# Patient Record
Sex: MALE | Race: WHITE | Employment: FULL TIME | URBAN - METROPOLITAN AREA
[De-identification: names, ages, dates, MRNs, and addresses within clinical notes are randomized per-mention and may not be internally consistent; named-entity substitution may affect disease eponyms.]

---

## 2020-12-29 ENCOUNTER — IMMUNIZATIONS (OUTPATIENT)
Dept: FAMILY MEDICINE CLINIC | Facility: HOSPITAL | Age: 65
End: 2020-12-29
Payer: MEDICARE

## 2020-12-29 DIAGNOSIS — Z23 ENCOUNTER FOR IMMUNIZATION: ICD-10-CM

## 2020-12-29 PROCEDURE — 91301 SARS-COV-2 / COVID-19 MRNA VACCINE (MODERNA) 100 MCG: CPT

## 2020-12-29 PROCEDURE — 0011A SARS-COV-2 / COVID-19 MRNA VACCINE (MODERNA) 100 MCG: CPT

## 2021-01-26 ENCOUNTER — IMMUNIZATIONS (OUTPATIENT)
Dept: FAMILY MEDICINE CLINIC | Facility: HOSPITAL | Age: 66
End: 2021-01-26

## 2021-01-26 DIAGNOSIS — Z23 ENCOUNTER FOR IMMUNIZATION: Primary | ICD-10-CM

## 2021-01-26 PROCEDURE — 0012A SARS-COV-2 / COVID-19 MRNA VACCINE (MODERNA) 100 MCG: CPT

## 2021-01-26 PROCEDURE — 91301 SARS-COV-2 / COVID-19 MRNA VACCINE (MODERNA) 100 MCG: CPT

## 2021-09-08 ENCOUNTER — APPOINTMENT (OUTPATIENT)
Dept: URGENT CARE | Facility: CLINIC | Age: 66
End: 2021-09-08

## 2021-09-08 DIAGNOSIS — Z02.1 ENCOUNTER FOR PRE-EMPLOYMENT HEALTH SCREENING EXAMINATION: Primary | ICD-10-CM

## 2021-09-08 PROCEDURE — 86765 RUBEOLA ANTIBODY: CPT

## 2021-09-08 PROCEDURE — 86480 TB TEST CELL IMMUN MEASURE: CPT

## 2021-09-08 PROCEDURE — 86735 MUMPS ANTIBODY: CPT

## 2021-09-08 PROCEDURE — 86762 RUBELLA ANTIBODY: CPT

## 2021-09-08 PROCEDURE — 86787 VARICELLA-ZOSTER ANTIBODY: CPT

## 2021-09-09 LAB
MEV IGG SER QL: NORMAL
MUV IGG SER QL: NORMAL
RUBV IGG SERPL IA-ACNC: >175 IU/ML
VZV IGG SER IA-ACNC: NORMAL

## 2021-09-13 LAB
GAMMA INTERFERON BACKGROUND BLD IA-ACNC: 0.02 IU/ML
M TB IFN-G BLD-IMP: NEGATIVE
M TB IFN-G CD4+ BCKGRND COR BLD-ACNC: 0 IU/ML
M TB IFN-G CD4+ BCKGRND COR BLD-ACNC: 0 IU/ML
MITOGEN IGNF BCKGRD COR BLD-ACNC: >10 IU/ML

## 2025-01-07 NOTE — PROGRESS NOTES
Name: Regis Che      : 1955      MRN: [id number]  Encounter Provider: David Orozco MD  Encounter Date: 2025   Encounter department: [facility]  :  Assessment & Plan  Benign prostatic hyperplasia with lower urinary tract symptoms, symptom details unspecified  Known bph/bpe for 15 years  On alpha blocker and finasteride  Bothered by decreased semen volumes   Potentially interested in surgical therapy in the future after further workup of elevated PSA    Orders:    POCT urine dip    POCT Measure PVR    Elevated PSA  I discussed with the patient the discovery of the PSA molecule and its original use in determining the return of prostate cancer after definitive therapy.  I described the normal function of the PSA molecule in the reproductive process and also discussed the detection of PSA in the blood.  We discussed the controversial history of PSA screening for prostate cancer in the [place] as well as the risk of over detection and over treatment of prostate cancer by way of PSA screening.  The patient understands that PSA blood testing is an imperfect way to screen for prostate cancer and that elevated PSA levels in the blood may also be caused by infection, inflammation, prostatic trauma or manipulation, urological procedures, or by benign prostatic enlargement.    The role of the digital rectal examination in prostate cancer screening was also discussed and I discussed with him that there is large interobserver variability in the findings of digital rectal examination.    We discussed the continued workup of elevated PSA or abnormal digital rectal examination in the form of the performance of a prostate biopsy.  The preparation for, and steps of, an office-based transrectal ultrasound of prostate biopsy were described to the patient. Benefits of obtaining tissue for pathologic analysis were discussed with him and the risks of prostate biopsy were also  discussed at length.  These risks include but are not limited to infection, bleeding, pain, sepsis with need for admission to hospital, risk of change in sexual function, and risk of diagnosis with prostate cancer.  Alternatives to prostate biopsy in the form of continued PSA and OMAR surveillance were also offered to him.  All of his questions and concerns were answered and addressed with regard to that detailed above.    His prostate is asymmetrical with a left-sided prostate nodule near the base to mid gland measuring 1 cm.  Over the last few years his PSA has gone from 4.2 in 20 19-5.5 in 20 22-6.7 in 2023.  Additionally, given use of a 5 alpha reductase inhibitor when doubled this corrected value is near 14.    Recommend an MRI of the prostate for determination of MRI targeted versus office-based prostate biopsy.    He will return in some weeks for review of his prostate MRI.  Following results of this he will need either a MRI targeted biopsy or an office-based prostate biopsy if no target lesions noted on MRI    Orders:    Basic metabolic panel; Future    MRI prostate multiparametric wo w contrast; Future      Urinalysis  PVR please  Does he have a psa? If not he needs one    History of Present Illness   Regis Che is a 69 y.o. male who presents for elevated psa and bph/LUTS  On dual medical therapy at this time    Referred to me in consultation by Yovany Soto MD, today's note has been sent to the referring doctor.            PVR 45 mL  Urine dip analysis today is negative for leukocytes, nitrites, + for trace blood, protein, ketones, and glucose and is clear yellow      The following portions of the patient's history were reviewed and updated as appropriate: allergies, current medications, past family history, past medical history, past social history, past surgical history and problem list.    AUA SYMPTOM SCORE      Flowsheet Row Most Recent Value   AUA SYMPTOM SCORE    How often have you had a  "sensation of not emptying your bladder completely after you finished urinating? 3 (P)     How often have you had to urinate again less than two hours after you finished urinating? 5 (P)     How often have you found you stopped and started again several times when you urinate? 3 (P)     How often have you found it difficult to postpone urination? 1 (P)     How often have you had a weak urinary stream? 1 (P)     How often have you had to push or strain to begin urination? 0 (P)     How many times did you most typically get up to urinate from the time you went to bed at night until the time you got up in the morning? 4 (P)     Quality of Life: If you were to spend the rest of your life with your urinary condition just the way it is now, how would you feel about that? 3 (P)     AUA SYMPTOM SCORE 17 (P)            Review of Systems   Constitutional: Negative.    HENT: Negative.     Eyes: Negative.    Respiratory: Negative.     Cardiovascular: Negative.    Gastrointestinal: Negative.    Endocrine: Negative.    Genitourinary: Negative.    Musculoskeletal: Negative.    Skin: Negative.    Allergic/Immunologic: Negative.    Neurological: Negative.    Hematological: Negative.    Psychiatric/Behavioral: Negative.            Objective   /84 (BP Location: Left arm, Patient Position: Sitting, Cuff Size: Adult)   Pulse 57   Ht 5' 5\" (1.651 m)   Wt 68.9 kg (151 lb 12.8 oz)   SpO2 99%   BMI 25.26 kg/m²     Physical Exam  Vitals reviewed.   Constitutional:       General: He is not in acute distress.     Appearance: Normal appearance. He is well-developed. He is not ill-appearing, toxic-appearing or diaphoretic.   HENT:      Head: Normocephalic and atraumatic.      Nose: Nose normal.      Mouth/Throat:      Mouth: Mucous membranes are moist.   Eyes:      General: No scleral icterus.        Right eye: No discharge.         Left eye: No discharge.   Neck:      Thyroid: No thyromegaly.      Trachea: No tracheal deviation. " "  Pulmonary:      Effort: Pulmonary effort is normal.   Chest:      Chest wall: No tenderness.   Abdominal:      General: There is no distension.      Palpations: There is no mass.      Tenderness: There is no abdominal tenderness. There is no guarding.      Hernia: No hernia is present.   Genitourinary:     Comments: Enlarged asymmetrical prostate with more prominence of the left Justus prostate and with a nodule at the base to mid gland measuring 1 cm  Musculoskeletal:         General: No deformity or signs of injury. Normal range of motion.      Cervical back: Normal range of motion and neck supple.   Lymphadenopathy:      Cervical: No cervical adenopathy.   Skin:     General: Skin is dry.      Coloration: Skin is not jaundiced or pale.      Findings: No erythema.   Neurological:      Mental Status: He is alert and oriented to person, place, and time.      Cranial Nerves: No cranial nerve deficit.      Motor: No abnormal muscle tone.      Coordination: Coordination normal.   Psychiatric:         Mood and Affect: Mood normal.         Behavior: Behavior normal.         Thought Content: Thought content normal.         Judgment: Judgment normal.          Results  No results found for: \"PSA\"  No results found for: \"GLUCOSE\", \"CALCIUM\", \"NA\", \"K\", \"CO2\", \"CL\", \"BUN\", \"CREATININE\"  No results found for: \"WBC\", \"HGB\", \"HCT\", \"MCV\", \"PLT\"    Office Urine Dip  Recent Results (from the past hour)   POCT urine dip    Collection Time: 01/09/25  8:42 AM   Result Value Ref Range    LEUKOCYTE ESTERASE,UA neg     NITRITE,UA neg     SL AMB POCT UROBILINOGEN 0.2     POCT URINE PROTEIN neg      PH,UA 5.0     BLOOD,UA trace     SPECIFIC GRAVITY,UA 1.015     KETONES,UA neg     BILIRUBIN,UA neg     GLUCOSE, UA neg      COLOR,UA yellow     CLARITY,UA clear    POCT Measure PVR    Collection Time: 01/09/25  8:46 AM   Result Value Ref Range    POST-VOID RESIDUAL VOLUME, ML POC 45 mL   ]      "

## 2025-01-09 ENCOUNTER — OFFICE VISIT (OUTPATIENT)
Dept: UROLOGY | Facility: HOSPITAL | Age: 70
End: 2025-01-09
Payer: MEDICARE

## 2025-01-09 VITALS
BODY MASS INDEX: 25.29 KG/M2 | SYSTOLIC BLOOD PRESSURE: 124 MMHG | HEIGHT: 65 IN | WEIGHT: 151.8 LBS | DIASTOLIC BLOOD PRESSURE: 84 MMHG | HEART RATE: 57 BPM | OXYGEN SATURATION: 99 %

## 2025-01-09 DIAGNOSIS — N40.1 BENIGN PROSTATIC HYPERPLASIA WITH LOWER URINARY TRACT SYMPTOMS, SYMPTOM DETAILS UNSPECIFIED: ICD-10-CM

## 2025-01-09 DIAGNOSIS — R97.20 ELEVATED PSA: Primary | ICD-10-CM

## 2025-01-09 LAB
POST-VOID RESIDUAL VOLUME, ML POC: 45 ML
SL AMB  POCT GLUCOSE, UA: NORMAL
SL AMB LEUKOCYTE ESTERASE,UA: NORMAL
SL AMB POCT BILIRUBIN,UA: NORMAL
SL AMB POCT BLOOD,UA: NORMAL
SL AMB POCT CLARITY,UA: CLEAR
SL AMB POCT COLOR,UA: YELLOW
SL AMB POCT KETONES,UA: NORMAL
SL AMB POCT NITRITE,UA: NORMAL
SL AMB POCT PH,UA: 5
SL AMB POCT SPECIFIC GRAVITY,UA: 1.01
SL AMB POCT URINE PROTEIN: NORMAL
SL AMB POCT UROBILINOGEN: 0.2

## 2025-01-09 PROCEDURE — 99203 OFFICE O/P NEW LOW 30 MIN: CPT | Performed by: UROLOGY

## 2025-01-09 PROCEDURE — 81002 URINALYSIS NONAUTO W/O SCOPE: CPT | Performed by: UROLOGY

## 2025-01-09 PROCEDURE — 51798 US URINE CAPACITY MEASURE: CPT | Performed by: UROLOGY

## 2025-01-09 RX ORDER — FINASTERIDE 5 MG/1
TABLET, FILM COATED ORAL
COMMUNITY

## 2025-01-09 RX ORDER — SIMVASTATIN 20 MG
1 TABLET ORAL DAILY
COMMUNITY
Start: 2024-11-25

## 2025-01-09 RX ORDER — TAMSULOSIN HYDROCHLORIDE 0.4 MG/1
CAPSULE ORAL
COMMUNITY

## 2025-01-09 RX ORDER — ASPIRIN 81 MG/1
TABLET, CHEWABLE ORAL
COMMUNITY

## 2025-01-09 RX ORDER — LISINOPRIL 10 MG/1
TABLET ORAL
COMMUNITY

## 2025-01-09 RX ORDER — CHLORAL HYDRATE 500 MG
CAPSULE ORAL
COMMUNITY

## 2025-01-09 NOTE — ASSESSMENT & PLAN NOTE
Known bph/bpe for 15 years  On alpha blocker and finasteride  Bothered by decreased semen volumes   Potentially interested in surgical therapy in the future after further workup of elevated PSA    Orders:    POCT urine dip    POCT Measure PVR

## 2025-01-09 NOTE — ASSESSMENT & PLAN NOTE
I discussed with the patient the discovery of the PSA molecule and its original use in determining the return of prostate cancer after definitive therapy.  I described the normal function of the PSA molecule in the reproductive process and also discussed the detection of PSA in the blood.  We discussed the controversial history of PSA screening for prostate cancer in the [place] as well as the risk of over detection and over treatment of prostate cancer by way of PSA screening.  The patient understands that PSA blood testing is an imperfect way to screen for prostate cancer and that elevated PSA levels in the blood may also be caused by infection, inflammation, prostatic trauma or manipulation, urological procedures, or by benign prostatic enlargement.    The role of the digital rectal examination in prostate cancer screening was also discussed and I discussed with him that there is large interobserver variability in the findings of digital rectal examination.    We discussed the continued workup of elevated PSA or abnormal digital rectal examination in the form of the performance of a prostate biopsy.  The preparation for, and steps of, an office-based transrectal ultrasound of prostate biopsy were described to the patient. Benefits of obtaining tissue for pathologic analysis were discussed with him and the risks of prostate biopsy were also discussed at length.  These risks include but are not limited to infection, bleeding, pain, sepsis with need for admission to hospital, risk of change in sexual function, and risk of diagnosis with prostate cancer.  Alternatives to prostate biopsy in the form of continued PSA and OMAR surveillance were also offered to him.  All of his questions and concerns were answered and addressed with regard to that detailed above.    His prostate is asymmetrical with a left-sided prostate nodule near the base to mid gland measuring 1 cm.  Over the last few years his PSA has gone from  4.2 in 20 19-5.5 in 20 22-6.7 in 2023.  Additionally, given use of a 5 alpha reductase inhibitor when doubled this corrected value is near 14.    Recommend an MRI of the prostate for determination of MRI targeted versus office-based prostate biopsy.    He will return in some weeks for review of his prostate MRI.  Following results of this he will need either a MRI targeted biopsy or an office-based prostate biopsy if no target lesions noted on MRI    Orders:    Basic metabolic panel; Future    MRI prostate multiparametric wo w contrast; Future

## 2025-02-03 ENCOUNTER — TELEPHONE (OUTPATIENT)
Age: 70
End: 2025-02-03

## 2025-02-03 NOTE — TELEPHONE ENCOUNTER
Patient called in stating the the MRI was only completed by 1.5 instead of 3. The radiologist didn't notice the 3T didn't notice until after the study was completed. He is wondering if that will be ok. Please advise. Patient had the MRI completed at [facility] this morning.

## 2025-02-04 NOTE — TELEPHONE ENCOUNTER
Multiparametric MRIs of the prostate that are being used for possible OR transperineal prostate biopsies cannot be completed at an outside facility.  Whether it was a 1.5 or 3T, since it was completed at [facility] it would not be compatible with our OR machines.      He will likely need to repeat the MRI in network, if his insurance was pushing back to not approve this, we can set up a peer to peer so that it is approved.

## 2025-02-04 NOTE — TELEPHONE ENCOUNTER
Pt sts he spoke with insurance and they will need a prior authorization sent over to approve or deny another MRI.     Pt has MRI scheduled for 2/18/25 at . Please review and advise.    Pt cb- [phone]

## 2025-02-04 NOTE — TELEPHONE ENCOUNTER
Called [facility] and notified him that the Mri at [facility] is not compatible with [facility] he needs to check with Insurance if they will  pay for a 2nd MRI

## 2025-02-10 NOTE — TELEPHONE ENCOUNTER
Patient called in asking for an update on his prior authorization for his MRI. Please contact patient with an update.

## 2025-02-18 ENCOUNTER — HOSPITAL ENCOUNTER (OUTPATIENT)
Facility: MEDICAL CENTER | Age: 70
Discharge: HOME | End: 2025-02-18
Payer: MEDICARE

## 2025-02-18 DIAGNOSIS — R97.20 ELEVATED PSA: ICD-10-CM

## 2025-02-18 PROCEDURE — 72197 MRI PELVIS W/O & W/DYE: CPT

## 2025-02-18 PROCEDURE — 76377 3D RENDER W/INTRP POSTPROCES: CPT

## 2025-02-18 PROCEDURE — A9585 GADOBUTROL INJECTION: HCPCS | Performed by: UROLOGY

## 2025-02-18 RX ORDER — GADOBUTROL 604.72 MG/ML
6 INJECTION INTRAVENOUS
Status: COMPLETED | OUTPATIENT
Start: 2025-02-18 | End: 2025-02-18

## 2025-02-18 RX ADMIN — GADOBUTROL 6 ML: 604.72 INJECTION INTRAVENOUS at 12:40

## 2025-02-19 ENCOUNTER — APPOINTMENT (EMERGENCY)
Dept: CT IMAGING | Facility: HOSPITAL | Age: 70
End: 2025-02-19
Payer: MEDICARE

## 2025-02-19 ENCOUNTER — HOSPITAL ENCOUNTER (EMERGENCY)
Facility: HOSPITAL | Age: 70
Discharge: HOME | End: 2025-02-19
Attending: EMERGENCY MEDICINE | Admitting: EMERGENCY MEDICINE
Payer: MEDICARE

## 2025-02-19 VITALS
RESPIRATION RATE: 18 BRPM | SYSTOLIC BLOOD PRESSURE: 175 MMHG | WEIGHT: 150 LBS | OXYGEN SATURATION: 97 % | TEMPERATURE: 97.9 F | HEIGHT: 65 IN | BODY MASS INDEX: 24.99 KG/M2 | HEART RATE: 48 BPM | DIASTOLIC BLOOD PRESSURE: 80 MMHG

## 2025-02-19 DIAGNOSIS — R59.0 INGUINAL LYMPHADENOPATHY: ICD-10-CM

## 2025-02-19 DIAGNOSIS — R79.0 LOW MAGNESIUM LEVEL: ICD-10-CM

## 2025-02-19 DIAGNOSIS — R10.31 RIGHT GROIN PAIN: Primary | ICD-10-CM

## 2025-02-19 LAB
ANION GAP SERPL CALCULATED.3IONS-SCNC: 8 MMOL/L (ref 4–13)
BACTERIA UR QL AUTO: NORMAL /HPF
BASOPHILS # BLD AUTO: 0.03 THOUSANDS/ΜL (ref 0–0.1)
BASOPHILS NFR BLD AUTO: 0 % (ref 0–1)
BILIRUB UR QL STRIP: NEGATIVE
BUN SERPL-MCNC: 24 MG/DL (ref 5–25)
CALCIUM SERPL-MCNC: 9.3 MG/DL (ref 8.4–10.2)
CHLORIDE SERPL-SCNC: 107 MMOL/L (ref 96–108)
CK SERPL-CCNC: 100 U/L (ref 39–308)
CLARITY UR: CLEAR
CO2 SERPL-SCNC: 24 MMOL/L (ref 21–32)
COLOR UR: YELLOW
CREAT SERPL-MCNC: 1.12 MG/DL (ref 0.6–1.3)
EOSINOPHIL # BLD AUTO: 0.14 THOUSAND/ΜL (ref 0–0.61)
EOSINOPHIL NFR BLD AUTO: 2 % (ref 0–6)
ERYTHROCYTE [DISTWIDTH] IN BLOOD BY AUTOMATED COUNT: 12.8 % (ref 11.6–15.1)
GFR SERPL CREATININE-BSD FRML MDRD: 66 ML/MIN/1.73SQ M
GLUCOSE SERPL-MCNC: 112 MG/DL (ref 65–140)
GLUCOSE UR STRIP-MCNC: NEGATIVE MG/DL
HCT VFR BLD AUTO: 46 % (ref 36.5–49.3)
HGB BLD-MCNC: 15.3 G/DL (ref 12–17)
HGB UR QL STRIP.AUTO: ABNORMAL
IMM GRANULOCYTES # BLD AUTO: 0.05 THOUSAND/UL (ref 0–0.2)
IMM GRANULOCYTES NFR BLD AUTO: 1 % (ref 0–2)
KETONES UR STRIP-MCNC: NEGATIVE MG/DL
LEUKOCYTE ESTERASE UR QL STRIP: NEGATIVE
LYMPHOCYTES # BLD AUTO: 1.04 THOUSANDS/ΜL (ref 0.6–4.47)
LYMPHOCYTES NFR BLD AUTO: 15 % (ref 14–44)
MAGNESIUM SERPL-MCNC: 1.7 MG/DL (ref 1.9–2.7)
MCH RBC QN AUTO: 31 PG (ref 26.8–34.3)
MCHC RBC AUTO-ENTMCNC: 33.3 G/DL (ref 31.4–37.4)
MCV RBC AUTO: 93 FL (ref 82–98)
MONOCYTES # BLD AUTO: 0.66 THOUSAND/ΜL (ref 0.17–1.22)
MONOCYTES NFR BLD AUTO: 9 % (ref 4–12)
NEUTROPHILS # BLD AUTO: 5.23 THOUSANDS/ΜL (ref 1.85–7.62)
NEUTS SEG NFR BLD AUTO: 73 % (ref 43–75)
NITRITE UR QL STRIP: NEGATIVE
NON-SQ EPI CELLS URNS QL MICRO: NORMAL /HPF
NRBC BLD AUTO-RTO: 0 /100 WBCS
PH UR STRIP.AUTO: 6.5 [PH]
PLATELET # BLD AUTO: 171 THOUSANDS/UL (ref 149–390)
PMV BLD AUTO: 10.2 FL (ref 8.9–12.7)
POTASSIUM SERPL-SCNC: 3.9 MMOL/L (ref 3.5–5.3)
PROT UR STRIP-MCNC: ABNORMAL MG/DL
RBC # BLD AUTO: 4.93 MILLION/UL (ref 3.88–5.62)
RBC #/AREA URNS AUTO: NORMAL /HPF
SODIUM SERPL-SCNC: 139 MMOL/L (ref 135–147)
SP GR UR STRIP.AUTO: 1.02 (ref 1–1.03)
UROBILINOGEN UR STRIP-ACNC: <2 MG/DL
WBC # BLD AUTO: 7.15 THOUSAND/UL (ref 4.31–10.16)
WBC #/AREA URNS AUTO: NORMAL /HPF

## 2025-02-19 PROCEDURE — 99284 EMERGENCY DEPT VISIT MOD MDM: CPT

## 2025-02-19 PROCEDURE — 85025 COMPLETE CBC W/AUTO DIFF WBC: CPT | Performed by: EMERGENCY MEDICINE

## 2025-02-19 PROCEDURE — 82550 ASSAY OF CK (CPK): CPT | Performed by: EMERGENCY MEDICINE

## 2025-02-19 PROCEDURE — 96375 TX/PRO/DX INJ NEW DRUG ADDON: CPT

## 2025-02-19 PROCEDURE — 74177 CT ABD & PELVIS W/CONTRAST: CPT

## 2025-02-19 PROCEDURE — 36415 COLL VENOUS BLD VENIPUNCTURE: CPT | Performed by: EMERGENCY MEDICINE

## 2025-02-19 PROCEDURE — 83735 ASSAY OF MAGNESIUM: CPT | Performed by: EMERGENCY MEDICINE

## 2025-02-19 PROCEDURE — 99284 EMERGENCY DEPT VISIT MOD MDM: CPT | Performed by: EMERGENCY MEDICINE

## 2025-02-19 PROCEDURE — 81001 URINALYSIS AUTO W/SCOPE: CPT | Performed by: EMERGENCY MEDICINE

## 2025-02-19 PROCEDURE — 96365 THER/PROPH/DIAG IV INF INIT: CPT

## 2025-02-19 PROCEDURE — 80048 BASIC METABOLIC PNL TOTAL CA: CPT | Performed by: EMERGENCY MEDICINE

## 2025-02-19 RX ORDER — METHOCARBAMOL 500 MG/1
500 TABLET, FILM COATED ORAL 2 TIMES DAILY
Qty: 20 TABLET | Refills: 0 | Status: SHIPPED | OUTPATIENT
Start: 2025-02-19

## 2025-02-19 RX ORDER — KETOROLAC TROMETHAMINE 30 MG/ML
15 INJECTION, SOLUTION INTRAMUSCULAR; INTRAVENOUS ONCE
Status: COMPLETED | OUTPATIENT
Start: 2025-02-19 | End: 2025-02-19

## 2025-02-19 RX ORDER — METHOCARBAMOL 500 MG/1
500 TABLET, FILM COATED ORAL ONCE
Status: COMPLETED | OUTPATIENT
Start: 2025-02-19 | End: 2025-02-19

## 2025-02-19 RX ORDER — MORPHINE SULFATE 4 MG/ML
4 INJECTION, SOLUTION INTRAMUSCULAR; INTRAVENOUS ONCE
Status: COMPLETED | OUTPATIENT
Start: 2025-02-19 | End: 2025-02-19

## 2025-02-19 RX ORDER — MAGNESIUM SULFATE HEPTAHYDRATE 40 MG/ML
2 INJECTION, SOLUTION INTRAVENOUS ONCE
Status: COMPLETED | OUTPATIENT
Start: 2025-02-19 | End: 2025-02-19

## 2025-02-19 RX ADMIN — MAGNESIUM SULFATE HEPTAHYDRATE 2 G: 40 INJECTION, SOLUTION INTRAVENOUS at 05:53

## 2025-02-19 RX ADMIN — METHOCARBAMOL 500 MG: 500 TABLET ORAL at 05:56

## 2025-02-19 RX ADMIN — KETOROLAC TROMETHAMINE 15 MG: 30 INJECTION, SOLUTION INTRAMUSCULAR; INTRAVENOUS at 05:50

## 2025-02-19 RX ADMIN — IOHEXOL 100 ML: 350 INJECTION, SOLUTION INTRAVENOUS at 05:17

## 2025-02-19 RX ADMIN — MORPHINE SULFATE 4 MG: 4 INJECTION INTRAVENOUS at 04:58

## 2025-02-19 NOTE — DISCHARGE INSTRUCTIONS
Please follow-up with your primary care provider for further care, if symptoms worsen please return to emergency department

## 2025-02-19 NOTE — ED PROVIDER NOTES
Time reflects when diagnosis was documented in both MDM as applicable and the Disposition within this note       Time User Action Codes Description Comment    2/19/2025  5:53 AM Sandee Fontana [R10.31] Right groin pain     2/19/2025  5:53 AM Sandee Fontana [R79.0] Low magnesium level     2/19/2025  5:53 AM Sandee Fontana [R59.0] Inguinal lymphadenopathy           ED Disposition       ED Disposition   Discharge    Condition   Stable    Date/Time   Wed Feb 19, 2025  6:22 AM    Comment   Regis Che discharge to home/self care.                   Assessment & Plan       Medical Decision Making  69-year-old male with right-sided groin pain, unclear etiology possibly in setting of muscle strain, will obtain imaging to evaluate for hernia, there is low suspicion for spinal cord compression, cauda equina given no leg numbness no leg weakness, no urinary or fecal incontinence or retention, no midline back pain no saddle anesthesia.  Patient does not have any risk factors for epidural abscess or epidural hematoma.  Bilateral lower extremities neurovascularly intact with intact distal pulses, compartments soft low suspicion for vascular pathology such as VTE or peripheral arterial disease, low suspicion for compartment syndrome    Amount and/or Complexity of Data Reviewed  Labs: ordered.  Radiology: ordered.    Risk  Prescription drug management.        ED Course as of 02/19/25 0650   Wed Feb 19, 2025   0502 Patient had an MRI of the prostate done yesterday however results are not available at this point, attempted to get read overnight however unable to get read at this point       Medications   magnesium sulfate 2 g/50 mL IVPB (premix) 2 g (2 g Intravenous New Bag 2/19/25 0553)   morphine injection 4 mg (4 mg Intravenous Given 2/19/25 0458)   ketorolac (TORADOL) injection 15 mg (15 mg Intravenous Given 2/19/25 0550)   iohexol (OMNIPAQUE) 350 MG/ML injection (MULTI-DOSE) 100 mL (100 mL Intravenous Given 2/19/25 0517)    methocarbamol (ROBAXIN) tablet 500 mg (500 mg Oral Given 2/19/25 0556)       ED Risk Strat Scores                            SBIRT 22yo+      Flowsheet Row Most Recent Value   Initial Alcohol Screen: US AUDIT-C     1. How often do you have a drink containing alcohol? 0 Filed at: 02/19/2025 0436   2. How many drinks containing alcohol do you have on a typical day you are drinking?  0 Filed at: 02/19/2025 0436   3b. FEMALE Any Age, or MALE 65+: How often do you have 4 or more drinks on one occassion? 0 Filed at: 02/19/2025 0436   Audit-C Score 0 Filed at: 02/19/2025 0436   RAMON: How many times in the past year have you...    Used an illegal drug or used a prescription medication for non-medical reasons? Never Filed at: 02/19/2025 0436                            History of Present Illness       Chief Complaint   Patient presents with    Groin Pain     Pt c/o of r sided groin pain x 1 week.       Past Medical History:   Diagnosis Date    Hypertension       History reviewed. No pertinent surgical history.   Family History   Problem Relation Age of Onset    Stroke Sister       Social History     Tobacco Use    Smoking status: Never    Smokeless tobacco: Never   Vaping Use    Vaping status: Never Used   Substance Use Topics    Alcohol use: Not Currently    Drug use: Never      E-Cigarette/Vaping    E-Cigarette Use Never User       E-Cigarette/Vaping Substances    Nicotine No     THC No     CBD No     Flavoring No     Other No     Unknown No       I have reviewed and agree with the history as documented.     69-year-old male with history of spinal stenosis status post surgery many years ago presents for evaluation of right-sided groin pain radiating into his right upper thigh has been going on for approximately 1 week worse when he is laying flat and better when he is walking around during the day.  Has some paresthesias in the right upper thigh no leg swelling, no numbness of the extremities no saddle anesthesia no  urinary or fecal incontinence or retention.  Patient states that initially prior to him having spinal stenosis surgery he did have sciatica however those symptoms were down the back of his leg has never had any meralgia paresthetica or anterior leg symptoms.  There is no recent falls, no urinary complaints.  Patient states that he went to see his PCP who suggested that this may be related to a hernia and sent him to general surgery however he does not have an appointment until approximately 2 weeks from now.        Review of Systems   Constitutional:  Negative for appetite change, chills and fever.   HENT:  Negative for rhinorrhea and sore throat.    Eyes:  Negative for photophobia and visual disturbance.   Respiratory:  Negative for cough and shortness of breath.    Cardiovascular:  Negative for chest pain and palpitations.   Gastrointestinal:  Negative for abdominal pain and diarrhea.        Right groin pain   Genitourinary:  Negative for dysuria, frequency and urgency.   Skin:  Negative for rash.   Neurological:  Negative for dizziness and weakness.   All other systems reviewed and are negative.          Objective       ED Triage Vitals   Temperature Pulse Blood Pressure Respirations SpO2 Patient Position - Orthostatic VS   02/19/25 0348 02/19/25 0348 02/19/25 0351 02/19/25 0348 02/19/25 0348 --   97.9 °F (36.6 °C) 58 125/66 18 98 %       Temp Source Heart Rate Source BP Location FiO2 (%) Pain Score    02/19/25 0348 -- -- -- 02/19/25 0348    Temporal    7      Vitals      Date and Time Temp Pulse SpO2 Resp BP Pain Score FACES Pain Rating User   02/19/25 0550 -- -- -- -- -- 7 -- SV   02/19/25 0515 -- 54 99 % -- 159/70 -- -- SV   02/19/25 0458 -- -- -- -- -- 7 -- SV   02/19/25 0351 -- -- -- -- 125/66 -- -- EK   02/19/25 0348 97.9 °F (36.6 °C) 58 98 % 18 -- 7 -- EK            Physical Exam  Vitals and nursing note reviewed.   Constitutional:       Appearance: He is well-developed.   HENT:      Head: Normocephalic  and atraumatic.      Right Ear: External ear normal.      Left Ear: External ear normal.   Eyes:      Conjunctiva/sclera: Conjunctivae normal.      Pupils: Pupils are equal, round, and reactive to light.   Neck:      Vascular: No JVD.      Trachea: No tracheal deviation.   Cardiovascular:      Rate and Rhythm: Normal rate and regular rhythm.      Heart sounds: Normal heart sounds. No murmur heard.     No friction rub. No gallop.   Pulmonary:      Effort: Pulmonary effort is normal. No respiratory distress.      Breath sounds: No stridor. No wheezing or rales.   Abdominal:      General: There is no distension.      Palpations: Abdomen is soft. There is no mass.      Tenderness: There is no abdominal tenderness. There is no guarding or rebound.   Musculoskeletal:         General: Normal range of motion.      Cervical back: Normal range of motion and neck supple.      Right lower leg: No edema.      Left lower leg: No edema.      Comments: Bilateral lower extremities compartments soft neurovascularly intact with intact femoral and DP pulses, no saddle anesthesia  Negative straight leg raise test  There is no palpable hernias   Skin:     General: Skin is warm and dry.      Coloration: Skin is not pale.      Findings: No erythema or rash.   Neurological:      Mental Status: He is alert and oriented to person, place, and time.      Cranial Nerves: No cranial nerve deficit.         Results Reviewed       Procedure Component Value Units Date/Time    Magnesium [[id number]]  (Abnormal) Collected: 02/19/25 0418    Lab Status: Final result Specimen: Blood from Arm, Right Updated: 02/19/25 0507     Magnesium 1.7 mg/dL     CK [[id number]]  (Normal) Collected: 02/19/25 0418    Lab Status: Final result Specimen: Blood from Arm, Right Updated: 02/19/25 0507     Total  U/L     Basic metabolic panel [[id number]] Collected: 02/19/25 0418    Lab Status: Final result Specimen: Blood from Arm, Right Updated: 02/19/25 0445     Sodium  139 mmol/L      Potassium 3.9 mmol/L      Chloride 107 mmol/L      CO2 24 mmol/L      ANION GAP 8 mmol/L      BUN 24 mg/dL      Creatinine 1.12 mg/dL      Glucose 112 mg/dL      Calcium 9.3 mg/dL      eGFR 66 ml/min/1.73sq m     Narrative:      [employer] Kid[employer] guidelines for Chronic Kidney Disease (CKD):     Stage 1 with normal or high GFR (GFR > 90 mL/min/1.73 square meters)    Stage 2 Mild CKD (GFR = 60-89 mL/min/1.73 square meters)    Stage 3A Moderate CKD (GFR = 45-59 mL/min/1.73 square meters)    Stage 3B Moderate CKD (GFR = 30-44 mL/min/1.73 square meters)    Stage 4 Severe CKD (GFR = 15-29 mL/min/1.73 square meters)    Stage 5 End Stage CKD (GFR <15 mL/min/1.73 square meters)  Note: GFR calculation is accurate only with a steady state creatinine    Urine Microscopic [[id number]]  (Normal) Collected: 02/19/25 0422    Lab Status: Final result Specimen: Urine, Clean Catch Updated: 02/19/25 0434     RBC, UA 1-2 /hpf      WBC, UA 0-1 /hpf      Epithelial Cells None Seen /hpf      Bacteria, UA None Seen /hpf     UA w Reflex to Microscopic w Reflex to Culture [[id number]]  (Abnormal) Collected: 02/19/25 0422    Lab Status: Final result Specimen: Urine, Clean Catch Updated: 02/19/25 0434     Color, UA Yellow     Clarity, UA Clear     Specific Gravity, UA 1.020     pH, UA 6.5     Leukocytes, UA Negative     Nitrite, UA Negative     Protein, UA Trace mg/dl      Glucose, UA Negative mg/dl      Ketones, UA Negative mg/dl      Urobilinogen, UA <2.0 mg/dl      Bilirubin, UA Negative     Occult Blood, UA Trace    CBC and differential [[id number]] Collected: 02/19/25 0418    Lab Status: Final result Specimen: Blood from Arm, Right Updated: 02/19/25 0423     WBC 7.15 Thousand/uL      RBC 4.93 Million/uL      Hemoglobin 15.3 g/dL      Hematocrit 46.0 %      MCV 93 fL      MCH 31.0 pg      MCHC 33.3 g/dL      RDW 12.8 %      MPV 10.2 fL      Platelets 171 Thousands/uL      nRBC 0 /100 WBCs      Segmented % 73 %       Immature Grans % 1 %      Lymphocytes % 15 %      Monocytes % 9 %      Eosinophils Relative 2 %      Basophils Relative 0 %      Absolute Neutrophils 5.23 Thousands/µL      Absolute Immature Grans 0.05 Thousand/uL      Absolute Lymphocytes 1.04 Thousands/µL      Absolute Monocytes 0.66 Thousand/µL      Eosinophils Absolute 0.14 Thousand/µL      Basophils Absolute 0.03 Thousands/µL             CT recon only lumbar spine   Final Interpretation by Leon Otto MD (02/19 0553)      No fracture or traumatic subluxation.      Degenerative disc disease at L3-L4 and L5-S1            Workstation performed: [id number]         CT abdomen pelvis with contrast   Final Interpretation by Leon Otto MD (02/19 0536)      No acute intra-abdominal finding.      Enlarged prostate indenting the bladder.      Bilateral inguinal lymph nodes.         Workstation performed: [id number]             Procedures    ED Medication and Procedure Management   Prior to Admission Medications   Prescriptions Last Dose Informant Patient Reported? Taking?   Omega-3 Fatty Acids (fish oil) 1,000 mg  Self Yes No   aspirin 81 mg chewable tablet  Self Yes No   finasteride (PROSCAR) 5 mg tablet  Self Yes No   lisinopril (ZESTRIL) 10 mg tablet  Self Yes No   simvastatin (ZOCOR) 20 mg tablet  Self Yes No   Sig: Take 1 tablet by mouth in the morning   tamsulosin (FLOMAX) 0.4 mg  Self Yes No      Facility-Administered Medications: None     Patient's Medications   Discharge Prescriptions    DICLOFENAC SODIUM (VOLTAREN) 1 %    Apply 2 g topically 4 (four) times a day       Start Date: 2/19/2025 End Date: --       Order Dose: 2 g       Quantity: 100 g    Refills: 0    METHOCARBAMOL (ROBAXIN) 500 MG TABLET    Take 1 tablet (500 mg total) by mouth 2 (two) times a day       Start Date: 2/19/2025 End Date: --       Order Dose: 500 mg       Quantity: 20 tablet    Refills: 0     No discharge procedures on file.  ED SEPSIS DOCUMENTATION   Time  reflects when diagnosis was documented in both MDM as applicable and the Disposition within this note       Time User Action Codes Description Comment    2/19/2025  5:53 AM Sandee Fontana [R10.31] Right groin pain     2/19/2025  5:53 AM Sandee Fontana [R79.0] Low magnesium level     2/19/2025  5:53 AM Sandee Fontana [R59.0] Inguinal lymphadenopathy                  Sandee Fontana DO  02/19/25 0651

## 2025-02-24 NOTE — PROGRESS NOTES
Name: Regis Che      : 1955      MRN: [id number]  Encounter Provider: KONG Rodgers  Encounter Date: 2025   Encounter department: [facility]  :  Assessment & Plan  Elevated PSA  Seen by Dr. Orozco on 25 for elevated PSA   OMAR was concerning for a left sided nodule   PSA trends: 6.7 (13.4 corrected on finasteride) (), 5.5 (), 4.2 ()  25 mpMRI of the prostate- revealed a PIRADS 2, with low likelihood of any significant prostate cancer being present.   Prostate volume measured ~80mls.   Recommend follow up in 6 months with repeat PSA  Orders:    PSA Total, Diagnostic; Future    Benign prostatic hyperplasia with lower urinary tract symptoms, symptom details unspecified  Currently managed on finasteride 5mg and tamsulosin 0.4mg   Patient reports urinary frequency, urgency, and nocturia 2-4x  AUA symptom score 12  He is currently satisfied with his voiding pattern on medications at this time   We did discuss proceeding with cystoscopy to evaluate for bladder outlet surgery if symptoms worsen          History of Present Illness   Regis Che is a 69 y.o. male who presents for MRI review. Patient has a history of elevated PSA's from 4.2 in  to 5.5 in  to 6.7 (corrected to 13.4 on finasteride) in . She underwent an MRI of the prostate on 25 which showed a PIRADS 2 and calculated prostate volume of 80mls. He also has a history of BPH, managed on Finasteride 5mg and Tamsulosin 0.4mg. He reports urgency and frequency, nocturia 2-4x/night. He reports that the medications have significantly improved his symptoms and he is currently not too bothered by them. He reports being able to sleep 10 hours per night. He denies dysuria, flank/abdominal pain, feelings of incomplete bladder emptying, or gross hematuria.       AUA SYMPTOM SCORE      Flowsheet Row Most Recent Value   AUA SYMPTOM SCORE    How often have you had a sensation of not  "emptying your bladder completely after you finished urinating? 1 (P)     How often have you had to urinate again less than two hours after you finished urinating? 5 (P)     How often have you found you stopped and started again several times when you urinate? 1 (P)     How often have you found it difficult to postpone urination? 1 (P)     How often have you had a weak urinary stream? 1 (P)     How often have you had to push or strain to begin urination? 0 (P)     How many times did you most typically get up to urinate from the time you went to bed at night until the time you got up in the morning? 3 (P)     Quality of Life: If you were to spend the rest of your life with your urinary condition just the way it is now, how would you feel about that? 2 (P)     AUA SYMPTOM SCORE 12 (P)            Review of Systems   Constitutional:  Negative for chills, diaphoresis, fatigue and fever.   Respiratory:  Negative for cough and shortness of breath.    Gastrointestinal:  Negative for abdominal pain, diarrhea, nausea and vomiting.   Genitourinary:  Positive for frequency and urgency. Negative for decreased urine volume, difficulty urinating, dysuria, flank pain and hematuria.        Nocturia 2-4x/night   Musculoskeletal:  Negative for back pain and myalgias.   Skin:  Negative for pallor and wound.   Neurological:  Negative for dizziness, weakness, light-headedness and numbness.        Objective   /78 (BP Location: Left arm, Patient Position: Sitting, Cuff Size: Adult)   Pulse 61   Ht 5' 5\" (1.651 m)   Wt 69.9 kg (154 lb)   SpO2 99%   BMI 25.63 kg/m²     Physical Exam  Constitutional:       Appearance: Normal appearance.   HENT:      Head: Normocephalic and atraumatic.   Eyes:      Conjunctiva/sclera: Conjunctivae normal.   Pulmonary:      Effort: Pulmonary effort is normal. No respiratory distress.   Musculoskeletal:         General: Normal range of motion.      Cervical back: Normal range of motion.   Skin:     " General: Skin is warm and dry.   Neurological:      General: No focal deficit present.      Mental Status: He is alert and oriented to person, place, and time.   Psychiatric:         Mood and Affect: Mood normal.         Behavior: Behavior normal.        Results   Lab Results   Component Value Date    CALCIUM 9.3 02/19/2025    K 3.9 02/19/2025    CO2 24 02/19/2025     02/19/2025    BUN 24 02/19/2025    CREATININE 1.12 02/19/2025     Lab Results   Component Value Date    WBC 7.15 02/19/2025    HGB 15.3 02/19/2025    HCT 46.0 02/19/2025    MCV 93 02/19/2025     02/19/2025

## 2025-02-25 ENCOUNTER — OFFICE VISIT (OUTPATIENT)
Dept: UROLOGY | Facility: HOSPITAL | Age: 70
End: 2025-02-25
Payer: MEDICARE

## 2025-02-25 VITALS
OXYGEN SATURATION: 99 % | WEIGHT: 154 LBS | SYSTOLIC BLOOD PRESSURE: 126 MMHG | HEIGHT: 65 IN | DIASTOLIC BLOOD PRESSURE: 78 MMHG | HEART RATE: 61 BPM | BODY MASS INDEX: 25.66 KG/M2

## 2025-02-25 DIAGNOSIS — N40.1 BENIGN PROSTATIC HYPERPLASIA WITH LOWER URINARY TRACT SYMPTOMS, SYMPTOM DETAILS UNSPECIFIED: ICD-10-CM

## 2025-02-25 DIAGNOSIS — R97.20 ELEVATED PSA: Primary | ICD-10-CM

## 2025-02-25 PROCEDURE — 99213 OFFICE O/P EST LOW 20 MIN: CPT

## 2025-02-25 RX ORDER — HYDROCODONE BITARTRATE AND ACETAMINOPHEN 5; 325 MG/1; MG/1
TABLET ORAL
COMMUNITY
Start: 2025-02-19

## 2025-02-25 NOTE — ASSESSMENT & PLAN NOTE
Currently managed on finasteride 5mg and tamsulosin 0.4mg   Patient reports urinary frequency, urgency, and nocturia 2-4x  AUA symptom score 12  He is currently satisfied with his voiding pattern on medications at this time   We did discuss proceeding with cystoscopy to evaluate for bladder outlet surgery if symptoms worsen

## 2025-02-25 NOTE — ASSESSMENT & PLAN NOTE
Seen by Dr. Orozco on 1/9/25 for elevated PSA   OMAR was concerning for a left sided nodule   PSA trends: 6.7 (13.4 corrected on finasteride) (2023), 5.5 (2022), 4.2 (2019)  2/18/25 mpMRI of the prostate- revealed a PIRADS 2, with low likelihood of any significant prostate cancer being present.   Prostate volume measured ~80mls.   Recommend follow up in 6 months with repeat PSA  Orders:    PSA Total, Diagnostic; Future

## 2025-07-29 ENCOUNTER — OFFICE VISIT (OUTPATIENT)
Age: 70
End: 2025-07-29
Payer: MEDICARE

## 2025-07-29 VITALS — BODY MASS INDEX: 23.66 KG/M2 | WEIGHT: 142 LBS | HEIGHT: 65 IN

## 2025-07-29 DIAGNOSIS — M54.16 LUMBAR RADICULITIS: ICD-10-CM

## 2025-07-29 DIAGNOSIS — Z98.890 HISTORY OF LUMBAR LAMINECTOMY: Primary | ICD-10-CM

## 2025-07-29 PROCEDURE — 99213 OFFICE O/P EST LOW 20 MIN: CPT
